# Patient Record
Sex: FEMALE | Employment: UNEMPLOYED | URBAN - METROPOLITAN AREA
[De-identification: names, ages, dates, MRNs, and addresses within clinical notes are randomized per-mention and may not be internally consistent; named-entity substitution may affect disease eponyms.]

---

## 2019-02-06 ENCOUNTER — HOSPITAL ENCOUNTER (EMERGENCY)
Facility: CLINIC | Age: 44
Discharge: HOME | End: 2019-02-07
Attending: FAMILY MEDICINE | Admitting: FAMILY MEDICINE

## 2019-02-06 DIAGNOSIS — L29.9 PRURITUS: ICD-10-CM

## 2019-02-06 DIAGNOSIS — T07.XXXA EXCORIATION OF MULTIPLE SITES: ICD-10-CM

## 2019-02-06 PROCEDURE — 99284 EMERGENCY DEPT VISIT MOD MDM: CPT | Performed by: FAMILY MEDICINE

## 2019-02-06 PROCEDURE — 99284 EMERGENCY DEPT VISIT MOD MDM: CPT | Mod: Z6 | Performed by: FAMILY MEDICINE

## 2019-02-06 PROCEDURE — 87210 SMEAR WET MOUNT SALINE/INK: CPT | Performed by: FAMILY MEDICINE

## 2019-02-06 PROCEDURE — 87591 N.GONORRHOEAE DNA AMP PROB: CPT | Performed by: FAMILY MEDICINE

## 2019-02-06 PROCEDURE — 87491 CHLMYD TRACH DNA AMP PROBE: CPT | Performed by: FAMILY MEDICINE

## 2019-02-06 SDOH — HEALTH STABILITY: MENTAL HEALTH: HOW OFTEN DO YOU HAVE A DRINK CONTAINING ALCOHOL?: NEVER

## 2019-02-06 NOTE — ED AVS SNAPSHOT
[facility], [place], Emergency Department  [address]  [facility] [address]  Phone:  [phone]  Fax:  [phone]                                    Caitlyn Shearer   MRN: [id number]    Department:  [facility], Emergency Department   Date of Visit:  2/6/2019           After Visit Summary Signature Page    I have received my discharge instructions, and my questions have been answered. I have discussed any challenges I see with this plan with the nurse or doctor.    ..........................................................................................................................................  Patient/Patient Representative Signature      ..........................................................................................................................................  Patient Representative Print Name and Relationship to Patient    ..................................................               ................................................  Date                                   Time    ..........................................................................................................................................  Reviewed by Signature/Title    ...................................................              ..............................................  Date                                               Time          22EPIC Rev 08/18

## 2019-02-07 VITALS
DIASTOLIC BLOOD PRESSURE: 65 MMHG | OXYGEN SATURATION: 98 % | WEIGHT: 170 LBS | RESPIRATION RATE: 16 BRPM | SYSTOLIC BLOOD PRESSURE: 140 MMHG | TEMPERATURE: 96.4 F

## 2019-02-07 LAB
C TRACH DNA SPEC QL NAA+PROBE: NEGATIVE
N GONORRHOEA DNA SPEC QL NAA+PROBE: NEGATIVE
SPECIMEN SOURCE: NORMAL
WET PREP SPEC: NORMAL

## 2019-02-07 PROCEDURE — 25000132 ZZH RX MED GY IP 250 OP 250 PS 637: Performed by: FAMILY MEDICINE

## 2019-02-07 RX ORDER — HYDROXYZINE PAMOATE 25 MG/1
25 CAPSULE ORAL 3 TIMES DAILY PRN
Qty: 30 CAPSULE | Refills: 0 | Status: SHIPPED | OUTPATIENT
Start: 2019-02-07 | End: 2019-02-17

## 2019-02-07 RX ORDER — IVERMECTIN 3 MG/1
3 TABLET ORAL ONCE
Status: COMPLETED | OUTPATIENT
Start: 2019-02-07 | End: 2019-02-07

## 2019-02-07 RX ORDER — PERMETHRIN 50 MG/G
CREAM TOPICAL ONCE
Status: COMPLETED | OUTPATIENT
Start: 2019-02-07 | End: 2019-02-07

## 2019-02-07 RX ORDER — ACETAMINOPHEN 500 MG
1000 TABLET ORAL ONCE
Status: COMPLETED | OUTPATIENT
Start: 2019-02-07 | End: 2019-02-07

## 2019-02-07 RX ADMIN — ACETAMINOPHEN 1000 MG: 500 TABLET ORAL at 00:43

## 2019-02-07 RX ADMIN — IVERMECTIN 3 MG: 3 TABLET ORAL at 00:43

## 2019-02-07 RX ADMIN — PERMETHRIN: 50 CREAM TOPICAL at 00:43

## 2019-02-07 ASSESSMENT — ENCOUNTER SYMPTOMS
NAUSEA: 0
SLEEP DISTURBANCE: 0
ABDOMINAL PAIN: 0
SHORTNESS OF BREATH: 0
WEAKNESS: 0
APPETITE CHANGE: 0
AGITATION: 1
BRUISES/BLEEDS EASILY: 0
DYSPHORIC MOOD: 1
VOMITING: 0
NERVOUS/ANXIOUS: 1
BACK PAIN: 0
DECREASED CONCENTRATION: 1
SEIZURES: 0
ACTIVITY CHANGE: 0
FEVER: 0

## 2019-02-07 NOTE — ED NOTES
Pt is awake. She states that she has not eaten in 2 days. Her breakfast tray is sitting bedside her. When I asked about this she replied that she only got pancakes and lozano. She states that this is not enough food to feed anyone. She has been given a sandwich
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
## 2019-02-07 NOTE — DISCHARGE INSTRUCTIONS
Home.  Your vaginal test did not show any yeast or other vaginal infection.  You certainly may be having symptoms from lice or scabies.  You received an oral medication in the ER to treat potential scabies.  You also applied the permthrin cream to body to treat scabies and lice.  You should leave this on for 8-12 hours, then shower which should treat any potential infection.  Wash all bedding and clothing.  Vistaril for itching as needed.  Follow up with MD for further evaluation.    Please make an appointment to follow up with Your Primary Care Provider, [facility] (phone: [phone], [facility] (phone: [phone]) and [facility] (phone: [phone]) as soon as possible.

## 2019-02-07 NOTE — ED NOTES
Pt is stating that she must get get to the methadone clinic by 12 N. She states that she has received no treatment here. She was reminded that she has received treatment for abd pain, vaginal itching, and lice. She is yelling that she still has bugs in her hair. She was given instructions on how to comb her hair out. She refused. Pt is stating that she does not have any shoes. She was given a pair from the emergency clothes closet and she threw them across the room. Pt has a pair of slippers on her feet as she was leaving.  Pt was given discharge instructions. She refused to sign or to allow VS. Pt states that she can go to her daughter's. Pt refusing to take a bus to her clinic. A cab will be ordered for her. Pt inially refused to take the discharge script for Vistaril. It was placed on her bed. The pt did pick it up.

## 2019-02-07 NOTE — ED TRIAGE NOTES
"Pt. c/o pain under left breast from \"bug bite.\"   Also having abd. pain,  vaginal pain with discharge.  "

## 2019-02-07 NOTE — ED NOTES
Patient signed out to me by my partner awaiting social work consult in the morning.  Patient spent the night in the emergency department and she received a breakfast tray that she ate in entirety.  After social work evaluation patient refused social work's recommendations and help in finding her safe place to stay.  She stated that she would leave the emergency department, proceed to her methadone clinic appointment at noon, and afterwards go to her daughter's house.  She was discharged and taxi ride was arranged for her.  She was extremely rude, argumentative, and insulting to the staff upon discharge.  It is unclear to me what exactly she wanted from us this morning.  I did not see any acute life-threatening signs or issues that had to be addressed again in the morning, prior to discharge.     Chang Santos MD  02/07/19 [id number]